# Patient Record
Sex: MALE | Race: BLACK OR AFRICAN AMERICAN | URBAN - METROPOLITAN AREA
[De-identification: names, ages, dates, MRNs, and addresses within clinical notes are randomized per-mention and may not be internally consistent; named-entity substitution may affect disease eponyms.]

---

## 2022-03-21 ENCOUNTER — OFFICE VISIT (OUTPATIENT)
Dept: URBAN - METROPOLITAN AREA CLINIC 124 | Facility: CLINIC | Age: 27
End: 2022-03-21
Payer: COMMERCIAL

## 2022-03-21 ENCOUNTER — WEB ENCOUNTER (OUTPATIENT)
Dept: URBAN - METROPOLITAN AREA CLINIC 124 | Facility: CLINIC | Age: 27
End: 2022-03-21

## 2022-03-21 VITALS — BODY MASS INDEX: 20.3 KG/M2 | HEIGHT: 71 IN | WEIGHT: 145 LBS

## 2022-03-21 DIAGNOSIS — R13.19 ESOPHAGEAL DYSPHAGIA: ICD-10-CM

## 2022-03-21 DIAGNOSIS — K30 INDIGESTION: ICD-10-CM

## 2022-03-21 DIAGNOSIS — R14.0 BLOATING: ICD-10-CM

## 2022-03-21 PROCEDURE — 99203 OFFICE O/P NEW LOW 30 MIN: CPT | Performed by: PHYSICIAN ASSISTANT

## 2022-03-21 RX ORDER — OMEPRAZOLE 40 MG/1
1 CAPSULE 30 MINUTES BEFORE MORNING MEAL CAPSULE, DELAYED RELEASE ORAL ONCE A DAY
Qty: 90 | Refills: 3 | OUTPATIENT
Start: 2022-03-21

## 2022-03-21 NOTE — HPI-TODAY'S VISIT:
26yoM presents for eval of bloating and indigestion.  In 2017 was very active (lifting weights, exercising) and one day after exercising had a severe coughing fit and then started to notice indigesion, heartburn, sternal chest pain, worse with sneezing, occuring about 2/week. Had constipation at that time as well but started using black seed oil for constipation and chest pain, indigestion, bloating, heartburn, gas has been occuring less often since. He is no longer using black seed oil and has BMs daily without hematochezia or melena. He continues to note post-prandial bloating, worse with eating  big portions and feels unable to eat enough to exercise. Has lost 13# due to not being able to eat the calories he wants. Rare nausea, no vomiting. Some dysphagia to meats. No prior EGD. No meds for GERD. Pepp oil helps some with bloating.

## 2022-04-11 ENCOUNTER — CLAIMS CREATED FROM THE CLAIM WINDOW (OUTPATIENT)
Dept: URBAN - METROPOLITAN AREA SURGERY CENTER 16 | Facility: SURGERY CENTER | Age: 27
End: 2022-04-11
Payer: COMMERCIAL

## 2022-04-11 DIAGNOSIS — K21.00 ALKALINE REFLUX ESOPHAGITIS: ICD-10-CM

## 2022-04-11 DIAGNOSIS — K29.60 ADENOPAPILLOMATOSIS GASTRICA: ICD-10-CM

## 2022-04-11 PROCEDURE — 43239 EGD BIOPSY SINGLE/MULTIPLE: CPT | Performed by: INTERNAL MEDICINE

## 2022-04-11 PROCEDURE — G8907 PT DOC NO EVENTS ON DISCHARG: HCPCS | Performed by: INTERNAL MEDICINE

## 2022-04-25 ENCOUNTER — DASHBOARD ENCOUNTERS (OUTPATIENT)
Age: 27
End: 2022-04-25

## 2022-04-25 ENCOUNTER — OFFICE VISIT (OUTPATIENT)
Dept: URBAN - METROPOLITAN AREA CLINIC 124 | Facility: CLINIC | Age: 27
End: 2022-04-25
Payer: COMMERCIAL

## 2022-04-25 VITALS
TEMPERATURE: 97.4 F | WEIGHT: 140 LBS | DIASTOLIC BLOOD PRESSURE: 74 MMHG | SYSTOLIC BLOOD PRESSURE: 132 MMHG | HEART RATE: 73 BPM | BODY MASS INDEX: 19.6 KG/M2 | RESPIRATION RATE: 14 BRPM | HEIGHT: 71 IN

## 2022-04-25 DIAGNOSIS — R14.0 BLOATING: ICD-10-CM

## 2022-04-25 DIAGNOSIS — R13.19 ESOPHAGEAL DYSPHAGIA: ICD-10-CM

## 2022-04-25 DIAGNOSIS — K30 INDIGESTION: ICD-10-CM

## 2022-04-25 PROBLEM — 116289008: Status: ACTIVE | Noted: 2022-03-25

## 2022-04-25 PROBLEM — 40890009: Status: ACTIVE | Noted: 2022-03-25

## 2022-04-25 PROBLEM — 162031009: Status: ACTIVE | Noted: 2022-03-21

## 2022-04-25 PROCEDURE — 99213 OFFICE O/P EST LOW 20 MIN: CPT | Performed by: PHYSICIAN ASSISTANT

## 2022-04-25 RX ORDER — OMEPRAZOLE 40 MG/1
1 CAPSULE 30 MINUTES BEFORE MORNING MEAL CAPSULE, DELAYED RELEASE ORAL ONCE A DAY
Qty: 90 | Refills: 3 | OUTPATIENT

## 2022-04-25 RX ORDER — OMEPRAZOLE 40 MG/1
1 CAPSULE 30 MINUTES BEFORE MORNING MEAL CAPSULE, DELAYED RELEASE ORAL ONCE A DAY
Qty: 90 | Refills: 3 | Status: ON HOLD | COMMUNITY
Start: 2022-03-21

## 2022-04-25 NOTE — HPI-TODAY'S VISIT:
26yoM presents for f/u of bloating and indigestion.     In 2017 was very active (lifting weights, exercising) and one day after exercising had a severe coughing fit and then started to notice indigesion, heartburn, sternal chest pain, worse with sneezing, occuring about 2/week. Had constipation at that time as well but started using black seed oil for constipation and chest pain, indigestion, bloating, heartburn, gas has been occuring less often since. He is no longer using black seed oil and has BMs daily without hematochezia or melena.  EGD normal, [place] mild [place] w[place]o [place] [facility] [place]. Continues to have bloating, worse post-prandially regardless of what he eats but always present. Has been able to increase caloric intake with protein shakes but lost another 5# (thinks mental and nervous to eat full portions). Having CP only after sneezing or coughing but no further heartburn. BMs still daily. Nausea much less often. No dysphagia. Pepp oil helped in the past.

## 2022-07-25 ENCOUNTER — OFFICE VISIT (OUTPATIENT)
Dept: URBAN - METROPOLITAN AREA CLINIC 124 | Facility: CLINIC | Age: 27
End: 2022-07-25